# Patient Record
Sex: FEMALE | Race: WHITE | URBAN - METROPOLITAN AREA
[De-identification: names, ages, dates, MRNs, and addresses within clinical notes are randomized per-mention and may not be internally consistent; named-entity substitution may affect disease eponyms.]

---

## 2020-07-31 ENCOUNTER — OFFICE VISIT (OUTPATIENT)
Dept: ENT CLINIC | Age: 35
End: 2020-07-31
Payer: COMMERCIAL

## 2020-07-31 VITALS
SYSTOLIC BLOOD PRESSURE: 115 MMHG | HEIGHT: 63 IN | TEMPERATURE: 98.7 F | HEART RATE: 67 BPM | BODY MASS INDEX: 38.09 KG/M2 | DIASTOLIC BLOOD PRESSURE: 82 MMHG | WEIGHT: 215 LBS

## 2020-07-31 PROCEDURE — 99203 OFFICE O/P NEW LOW 30 MIN: CPT | Performed by: OTOLARYNGOLOGY

## 2020-07-31 ASSESSMENT — ENCOUNTER SYMPTOMS
RESPIRATORY NEGATIVE: 1
ALLERGIC/IMMUNOLOGIC NEGATIVE: 1
EYES NEGATIVE: 1

## 2020-07-31 NOTE — PROGRESS NOTES
SUBJECTIVE:    Chief Complaint   Patient presents with    Ear Problem     Rt earlobe evaluation         Ivon Ibrahim is a 30 y.o. female    Patient relates that she is noted a split in her right earlobe which is been occurring over the course the past few months. Otherwise she has been in excellent health. She does not smoke. She has had no fever. No past medical history on file. No past surgical history on file. No family history on file. Social History     Tobacco Use    Smoking status: Never Smoker    Smokeless tobacco: Never Used   Substance Use Topics    Alcohol use: Not on file        Review of Systems:  Review of Systems   Constitutional: Negative. Negative for fatigue and fever. HENT: Negative. Eyes: Negative. Respiratory: Negative. Cardiovascular: Negative. Endocrine: Negative. Skin: Negative. Allergic/Immunologic: Negative. Neurological: Negative. Hematological: Negative. Psychiatric/Behavioral: Negative. OBJECTIVE:  /82   Pulse 67   Temp 98.7 °F (37.1 °C)   Ht 5' 3\" (1.6 m)   Wt 215 lb (97.5 kg)   BMI 38.09 kg/m²   Physical Exam  Vitals signs and nursing note reviewed. Constitutional:       General: She is not in acute distress. Appearance: Normal appearance. She is normal weight. She is not ill-appearing or toxic-appearing. HENT:      Head: Normocephalic and atraumatic. Right Ear: Tympanic membrane and ear canal normal. There is no impacted cerumen. Left Ear: Tympanic membrane, ear canal and external ear normal. There is no impacted cerumen. Ears:      Comments: There is a split in the earlobe on the right side of approximately 2 cm. Nose: Nose normal. No congestion or rhinorrhea. Mouth/Throat:      Mouth: Mucous membranes are moist.      Pharynx: Oropharynx is clear. No oropharyngeal exudate or posterior oropharyngeal erythema. Eyes:      Extraocular Movements: Extraocular movements intact.

## 2020-08-07 ENCOUNTER — OFFICE VISIT (OUTPATIENT)
Dept: ENT CLINIC | Age: 35
End: 2020-08-07

## 2020-08-07 VITALS
HEART RATE: 85 BPM | SYSTOLIC BLOOD PRESSURE: 137 MMHG | WEIGHT: 215 LBS | HEIGHT: 63 IN | TEMPERATURE: 97.3 F | BODY MASS INDEX: 38.09 KG/M2 | DIASTOLIC BLOOD PRESSURE: 76 MMHG

## 2020-08-07 PROCEDURE — MISCEARU EARLOBES, REPAIR SPLIT - UNILATERAL: Performed by: OTOLARYNGOLOGY

## 2020-08-07 RX ORDER — CEPHALEXIN 500 MG/1
500 CAPSULE ORAL 3 TIMES DAILY
Qty: 15 CAPSULE | Refills: 0 | Status: SHIPPED | OUTPATIENT
Start: 2020-08-07 | End: 2020-08-12

## 2020-08-07 RX ORDER — ACETAMINOPHEN AND CODEINE PHOSPHATE 300; 30 MG/1; MG/1
1 TABLET ORAL EVERY 4 HOURS PRN
Qty: 30 TABLET | Refills: 0 | Status: SHIPPED | OUTPATIENT
Start: 2020-08-07 | End: 2020-08-17

## 2020-08-07 NOTE — PROGRESS NOTES
Operation    Preoperative diagnosis: Split earlobe right. Postoperative diagnosis: Same. Procedure: Plastic repair of split earlobe right side. Anesthesia: 3 mL 1% Xylocaine with epinephrine 1-100,000. Operative procedure: Patient has a split earlobe on the right side measuring approximately 2 inches in length. The area is injected with 1% Xylocaine with epinephrine 1-100,000 routine manner and then prepped and draped in usual fashion. Planned excision on either side of the split is outlined with methylene blue. #11 blade is then utilized to excise the margins. Bleeding is relatively minimal.  The defect is then reapproximated with interrupted 5-0 nylon in a plastic manner both anteriorly and posteriorly. Steri-Strips are then applied. Estimated blood loss is 3 mL. Patient is instructed on wound care and keeping it clean. She is instructed to apply topical ice and compression if oozing might occur at home. She will return to the office in 6 days for suture removal.  She will be placed on Keflex for 5 days and Tylenol 3 for any pain.

## 2020-08-13 ENCOUNTER — OFFICE VISIT (OUTPATIENT)
Dept: ENT CLINIC | Age: 35
End: 2020-08-13

## 2020-08-13 VITALS
HEART RATE: 75 BPM | DIASTOLIC BLOOD PRESSURE: 80 MMHG | SYSTOLIC BLOOD PRESSURE: 132 MMHG | HEIGHT: 63 IN | WEIGHT: 215 LBS | BODY MASS INDEX: 38.09 KG/M2 | TEMPERATURE: 97.8 F

## 2020-08-13 PROCEDURE — 99999 PR OFFICE/OUTPT VISIT,PROCEDURE ONLY: CPT | Performed by: OTOLARYNGOLOGY

## 2020-08-13 NOTE — PROGRESS NOTES
Patient is here 6 days after repair of her split earlobe. Healing looks appropriate and the sutures are removed. She is instructed on how to clean the area and will return in 1 month for re-piercing.